# Patient Record
Sex: FEMALE | Race: BLACK OR AFRICAN AMERICAN | NOT HISPANIC OR LATINO | URBAN - METROPOLITAN AREA
[De-identification: names, ages, dates, MRNs, and addresses within clinical notes are randomized per-mention and may not be internally consistent; named-entity substitution may affect disease eponyms.]

---

## 2019-08-06 ENCOUNTER — EMERGENCY (EMERGENCY)
Facility: HOSPITAL | Age: 29
LOS: 1 days | Discharge: HOME | End: 2019-08-06
Admitting: EMERGENCY MEDICINE
Payer: COMMERCIAL

## 2019-08-06 VITALS
OXYGEN SATURATION: 100 % | TEMPERATURE: 98 F | RESPIRATION RATE: 18 BRPM | DIASTOLIC BLOOD PRESSURE: 82 MMHG | SYSTOLIC BLOOD PRESSURE: 131 MMHG | HEART RATE: 70 BPM

## 2019-08-06 PROCEDURE — 99283 EMERGENCY DEPT VISIT LOW MDM: CPT

## 2019-08-06 NOTE — ED ADULT TRIAGE NOTE - CHIEF COMPLAINT QUOTE
Patient comes in for sti check, states her partner had complains of "itchiness" for the past 3 days ( did not see, or was evaluated for it)  Patient however is without any symptoms, Request for HIV test as well.

## 2019-08-06 NOTE — ED PROVIDER NOTE - CLINICAL SUMMARY MEDICAL DECISION MAKING FREE TEXT BOX
patient asking for STD testing as partner has itching to genitals, patient asymptomatic, patient declining STD prophylaxis treatment in ED, she states she wishes to wait for results prior to treatment. advised safe sex practice and advised to have partner evaluated. follow up with pmd. patient had to leave for work, left prior to receiving discharge papers.

## 2019-08-06 NOTE — ED PROVIDER NOTE - OBJECTIVE STATEMENT
30 y/o female presents to the ED for STD/STI check. Pt is asymptomatic at this time but states that her partner is c/o itchiness x 3 days. Partner has not been evaluated or treated for STD/STI as of yet. Denies fever, chills, vaginal discharge, or vaginal itching. 28 y/o female presents to the ED for STD/STI check. Pt is asymptomatic at this time but states that her partner is c/o itchiness x 3 days. No lesions. Partner has not been evaluated or treated for STD/STI as of yet. Pt has +hx of STD in the past. Denies fever, chills, vaginal discharge, or vaginal itching. 28 y/o female presents to the ED for STD testing. Pt is asymptomatic at this time. sexually active with 1 male partner. she states that her partner was c/o itchiness to his pubic area.  she denies lesions, itchiness, vag discharge, abdominal pain, fever, chills. +hx of STD in the past. Upon my evaluation, patient in a rush to leave ED as she states she needs to get back to work - declining HIV testing or STD prophylaxis.

## 2019-08-06 NOTE — ED PROVIDER NOTE - PHYSICAL EXAMINATION
VITAL SIGNS: I have reviewed nursing notes and confirm.  CONSTITUTIONAL: Well-developed; well-nourished; in no acute distress.  SKIN: Skin is warm and dry, no acute rash.  HEAD: Normocephalic; atraumatic.  EYES: PERRL, EOM intact; conjunctiva and sclera clear.  ENT: No nasal discharge; airway clear.  NECK: Supple; non tender.  CARD: S1, S2 normal; no murmurs, gallops, or rubs. Regular rate and rhythm.  RESP: No wheezes, rales or rhonchi.  ABD: Normal bowel sounds; soft; non-distended; non-tender; no hepatosplenomegaly.  EXT: Normal ROM. No clubbing, cyanosis or edema.  NEURO: Alert, oriented. Grossly unremarkable.  PSYCH: Cooperative, appropriate. VITAL SIGNS: I have reviewed nursing notes and confirm.  CONSTITUTIONAL: Well-developed; well-nourished; in no acute distress.  SKIN: Skin is warm and dry, no acute rash.  HEAD: Normocephalic; atraumatic.  EYES: clear bilaterally  ENT: No nasal discharge; airway clear.  NECK: Supple; non tender.  CARD: S1, S2 normal; no murmurs, gallops, or rubs. Regular rate and rhythm.  RESP: No wheezes, rales or rhonchi.  ABD: Normal bowel sounds; soft; non-distended  EXT: Normal ROM. No clubbing, cyanosis or edema.  NEURO: Alert, oriented. Grossly unremarkable.  PSYCH: Cooperative, appropriate.

## 2019-08-06 NOTE — ED PROVIDER NOTE - NSFOLLOWUPINSTRUCTIONS_ED_ALL_ED_FT
Practice safe sex by using barrier protection such as condoms.     Follow up with your doctor    RETURN TO THE EMERGENCY DEPARTMENT FOR ANY CONCERNS.

## 2019-08-11 DIAGNOSIS — Z20.2 CONTACT WITH AND (SUSPECTED) EXPOSURE TO INFECTIONS WITH A PREDOMINANTLY SEXUAL MODE OF TRANSMISSION: ICD-10-CM
